# Patient Record
Sex: FEMALE | Race: WHITE | NOT HISPANIC OR LATINO | Employment: OTHER | URBAN - METROPOLITAN AREA
[De-identification: names, ages, dates, MRNs, and addresses within clinical notes are randomized per-mention and may not be internally consistent; named-entity substitution may affect disease eponyms.]

---

## 2023-05-09 ENCOUNTER — HOSPITAL ENCOUNTER (EMERGENCY)
Facility: CLINIC | Age: 78
Discharge: HOME | End: 2023-05-09
Attending: EMERGENCY MEDICINE | Admitting: EMERGENCY MEDICINE
Payer: COMMERCIAL

## 2023-05-09 ENCOUNTER — APPOINTMENT (OUTPATIENT)
Dept: GENERAL RADIOLOGY | Facility: CLINIC | Age: 78
End: 2023-05-09
Attending: EMERGENCY MEDICINE
Payer: COMMERCIAL

## 2023-05-09 VITALS
TEMPERATURE: 97.8 F | HEART RATE: 70 BPM | RESPIRATION RATE: 18 BRPM | OXYGEN SATURATION: 98 % | DIASTOLIC BLOOD PRESSURE: 81 MMHG | SYSTOLIC BLOOD PRESSURE: 194 MMHG

## 2023-05-09 DIAGNOSIS — T18.9XXA SWALLOWED FOREIGN BODY, INITIAL ENCOUNTER: ICD-10-CM

## 2023-05-09 PROCEDURE — 74018 RADEX ABDOMEN 1 VIEW: CPT

## 2023-05-09 PROCEDURE — 99284 EMERGENCY DEPT VISIT MOD MDM: CPT | Mod: 25

## 2023-05-09 PROCEDURE — 71045 X-RAY EXAM CHEST 1 VIEW: CPT

## 2023-05-09 NOTE — ED PROVIDER NOTES
Rapid Assessment Note    History:   Ira Flores is a 78 year old female who presents with requests for a chest X-ray after a crown fell off of her tooth at the dentist and was swallowed. She did not choke and was unable to cough it up. Denies any current pain. Staff from the dental clinic is here at bedside.     Exam:   General:  Alert, interactive  Cardiovascular:  Well perfused  Lungs:  No respiratory distress, no accessory muscle use  Neuro:  Moving all 4 extremities  Skin:  Warm, dry  Psych:  Normal affect    Plan of Care: Chest x-ray ordered.  I evaluated the patient and developed an initial plan of care. I discussed this plan and explained that I, or one of my partners, would be returning to complete the evaluation.       I, Vicky Chacon, am serving as a scribe to document services personally performed by Samara Kaiser MD based on my observations and the provider's statements to me.    5/9/2023  [facility]    Portions of this medical record were completed by a scribe. UPON MY REVIEW AND AUTHENTICATION BY ELECTRONIC SIGNATURE, this confirms (a) I performed the applicable clinical services, and (b) the record is accurate.        Samara Kaiser MD  05/09/23 [id number]

## 2023-05-09 NOTE — ED PROVIDER NOTES
History     Chief Complaint:  Swallowed Foreign Body       HPI   Ira Flores is a 78 year old female with a history of BCC, hyperlipidemia, hypertension, CKD, type II diabetes, among others, who presents after she swallowed her crown when she was at her dentist office.  There was no choking episode.  No vomiting.  She is able to swallow and speak normally.  No chest pain or stomach pain.  No throat pain.      Independent Historian:   None - Patient Only    Review of External Notes: none     ROS:  Review of Systems  Negative besides those mentioned in the HPI.    Allergies:  Atorvastatin  Azithromycin  Quercus Robur    Medications:    Flonase  Folic acid  Glucotrol XL  Levothyroxine  Metformin  Toprol XL  Aldactone  Blood glucose    Past Medical History:    SCCIS  PMB  BCC  Hyperlipidemia  Vitamin D deficiency  Hypertension  Allergic rhinoconjunctivitis  Hypothyroidism  Psoriasis  Obesity  Pulmonary nodule, right  Right breast cyst  Carpal tunnel syndrome of left wrist  CKD, stage III  SNHL  Type II diabetes  Epiretinal membrane, both eyes  Fatty liver  Actinic keratosis  Colon polyp  Osteopenia  Vitamin B12 deficiency  Thickened endometrium  Plantar fascial fibromatosis  Hemorrhoids  Nummular dermatitis  Hormone replacement therapy   Cataract  Ocular hypertension, bilateral  Microalbuminuria    Past Surgical History:    Cholecystectomy  Nasal septum surgery  D&C x 2  [place] teeth extraction  Tonsillectomy  Endometrial biopsy x 2     Family History:    Father: Alzheimer's disease, dementia, diabetes, hyperlipidemia, thyroid disorder  Mother: breast cancer, cataract, hypertension, macular degeneration, thyroid disorder  Brother(s): celiac disease, stroke, dementia  Son(s): arrhythmia    Social History:  The patient presents to the ED with a family member.  The patient presents to the ED via car.  PCP: Haley Kay     Physical Exam     Patient Vitals for the past 24 hrs:   BP Temp Temp src Pulse Resp SpO2    05/09/23 1329 (!) 194/81 97.8  F (36.6  C) Temporal 70 18 98 %      Physical Exam  Nursing note and vitals reviewed.  Constitutional:  Appears well-developed and well-nourished.  Swallowing and speaking normally.  No visible respiratory distress.  HENT:   Head:    Atraumatic.   Mouth/Throat:   Oropharynx is clear and moist. No oropharyngeal exudate.  Missing crown on her upper medial incisor.  Eyes:    Pupils are equal, round, and reactive to light.   Neck:    Normal range of motion. Neck supple.      No tracheal deviation present. No thyromegaly present.   Cardiovascular:  Normal rate, regular rhythm, no murmur   Pulmonary/Chest: Breath sounds are clear and equal without wheezes or crackles.  Abdominal:   Soft. Bowel sounds are normal. Exhibits no distension and      no mass. There is no tenderness.      There is no rebound and no guarding.   Musculoskeletal:  Exhibits no edema.   Lymphadenopathy:  No cervical adenopathy.   Neurological:   Alert and oriented to person, place, and time.   Skin:    Skin is warm and dry. No rash noted. No pallor.       Emergency Department Course   Imaging:  XR Abdomen 1 View   Final Result   IMPRESSION: There is a 9 mm metallic density structure within the mid   abdomen, likely representing the patient's swallowed dental crown.   This is likely within the central small bowel. Right upper quadrant   surgical clips. Nonobstructive bowel gas pattern. No definite free   air.      OLINDA CALVO MD            SYSTEM ID:  [id number]      XR Chest 1 View   Final Result   IMPRESSION: No radiodense foreign body is identified within the chest   or visualized upper abdomen. Right upper quadrant surgical clips. The   lungs are clear and there are no pleural effusions. Normal heart size.      OLINDA CALVO MD            SYSTEM ID:  [id number]         Report per radiology    Emergency Department Course & Assessments:       Assessments:   I obtained history and examined the patient as noted  above.    Independent Interpretation (X-rays, CTs, rhythm strip):  I reviewed the patient's chest x-ray and abdominal x-ray images myself and see the foreign body at around the umbilicus.  Foreign body has passed her stomach.  There is no sign of airway foreign body.    Consultations/Discussion of Management or Tests:  None        Social Determinants of Health affecting care:   None    Disposition:  The patient was discharged to home.     Impression & Plan        Medical Decision Making:  This patient swallowed her crown and it is past her stomach so there is no concern for development of bowel obstruction however she was told to return if she develops any abdominal pain or vomiting.  She was given reassurance that there was no sign of lung aspiration and discharged home.  She was told to follow-up with her dentist to have her crown redone.    Diagnosis:    ICD-10-CM    1. Swallowed foreign body, initial encounter  T18.9XXA            Scribe Disclosure:  I, Eniorobbi Buck, am serving as a scribe at 3:10 PM on 5/9/2023 to document services personally performed by Marlen Tomas MD, based on my observations and the provider's statements to me.   5/9/2023   Marlen Tomas MD [place], Cheri Lee, MD  05/12/23 1531